# Patient Record
Sex: MALE | Race: WHITE | NOT HISPANIC OR LATINO | URBAN - METROPOLITAN AREA
[De-identification: names, ages, dates, MRNs, and addresses within clinical notes are randomized per-mention and may not be internally consistent; named-entity substitution may affect disease eponyms.]

---

## 2023-06-10 ENCOUNTER — EMERGENCY (EMERGENCY)
Facility: HOSPITAL | Age: 63
LOS: 1 days | End: 2023-06-10
Attending: EMERGENCY MEDICINE
Payer: MEDICARE

## 2023-06-10 VITALS
RESPIRATION RATE: 16 BRPM | HEART RATE: 69 BPM | WEIGHT: 250 LBS | SYSTOLIC BLOOD PRESSURE: 127 MMHG | HEIGHT: 69 IN | TEMPERATURE: 98 F | OXYGEN SATURATION: 96 % | DIASTOLIC BLOOD PRESSURE: 82 MMHG

## 2023-06-10 VITALS
SYSTOLIC BLOOD PRESSURE: 128 MMHG | TEMPERATURE: 98 F | HEART RATE: 74 BPM | DIASTOLIC BLOOD PRESSURE: 66 MMHG | RESPIRATION RATE: 18 BRPM | OXYGEN SATURATION: 97 %

## 2023-06-10 LAB
ALBUMIN SERPL ELPH-MCNC: 4 G/DL — SIGNIFICANT CHANGE UP (ref 3.3–5)
ALP SERPL-CCNC: 60 U/L — SIGNIFICANT CHANGE UP (ref 40–120)
ALT FLD-CCNC: 51 U/L — SIGNIFICANT CHANGE UP (ref 12–78)
ANION GAP SERPL CALC-SCNC: 5 MMOL/L — SIGNIFICANT CHANGE UP (ref 5–17)
AST SERPL-CCNC: 30 U/L — SIGNIFICANT CHANGE UP (ref 15–37)
BASOPHILS # BLD AUTO: 0.03 K/UL — SIGNIFICANT CHANGE UP (ref 0–0.2)
BASOPHILS NFR BLD AUTO: 0.5 % — SIGNIFICANT CHANGE UP (ref 0–2)
BILIRUB SERPL-MCNC: 0.7 MG/DL — SIGNIFICANT CHANGE UP (ref 0.2–1.2)
BUN SERPL-MCNC: 17 MG/DL — SIGNIFICANT CHANGE UP (ref 7–23)
CALCIUM SERPL-MCNC: 9.1 MG/DL — SIGNIFICANT CHANGE UP (ref 8.5–10.1)
CHLORIDE SERPL-SCNC: 106 MMOL/L — SIGNIFICANT CHANGE UP (ref 96–108)
CK SERPL-CCNC: 219 U/L — SIGNIFICANT CHANGE UP (ref 26–308)
CO2 SERPL-SCNC: 26 MMOL/L — SIGNIFICANT CHANGE UP (ref 22–31)
CREAT SERPL-MCNC: 1.2 MG/DL — SIGNIFICANT CHANGE UP (ref 0.5–1.3)
D DIMER BLD IA.RAPID-MCNC: 262 NG/ML DDU — HIGH
EGFR: 68 ML/MIN/1.73M2 — SIGNIFICANT CHANGE UP
EOSINOPHIL # BLD AUTO: 0.13 K/UL — SIGNIFICANT CHANGE UP (ref 0–0.5)
EOSINOPHIL NFR BLD AUTO: 2.3 % — SIGNIFICANT CHANGE UP (ref 0–6)
GLUCOSE SERPL-MCNC: 124 MG/DL — HIGH (ref 70–99)
HCT VFR BLD CALC: 43.4 % — SIGNIFICANT CHANGE UP (ref 39–50)
HGB BLD-MCNC: 14.8 G/DL — SIGNIFICANT CHANGE UP (ref 13–17)
IMM GRANULOCYTES NFR BLD AUTO: 0.5 % — SIGNIFICANT CHANGE UP (ref 0–0.9)
LYMPHOCYTES # BLD AUTO: 1.27 K/UL — SIGNIFICANT CHANGE UP (ref 1–3.3)
LYMPHOCYTES # BLD AUTO: 22.4 % — SIGNIFICANT CHANGE UP (ref 13–44)
MCHC RBC-ENTMCNC: 30.6 PG — SIGNIFICANT CHANGE UP (ref 27–34)
MCHC RBC-ENTMCNC: 34.1 GM/DL — SIGNIFICANT CHANGE UP (ref 32–36)
MCV RBC AUTO: 89.9 FL — SIGNIFICANT CHANGE UP (ref 80–100)
MONOCYTES # BLD AUTO: 0.47 K/UL — SIGNIFICANT CHANGE UP (ref 0–0.9)
MONOCYTES NFR BLD AUTO: 8.3 % — SIGNIFICANT CHANGE UP (ref 2–14)
NEUTROPHILS # BLD AUTO: 3.73 K/UL — SIGNIFICANT CHANGE UP (ref 1.8–7.4)
NEUTROPHILS NFR BLD AUTO: 66 % — SIGNIFICANT CHANGE UP (ref 43–77)
NRBC # BLD: 0 /100 WBCS — SIGNIFICANT CHANGE UP (ref 0–0)
PLATELET # BLD AUTO: 226 K/UL — SIGNIFICANT CHANGE UP (ref 150–400)
POTASSIUM SERPL-MCNC: 4.4 MMOL/L — SIGNIFICANT CHANGE UP (ref 3.5–5.3)
POTASSIUM SERPL-SCNC: 4.4 MMOL/L — SIGNIFICANT CHANGE UP (ref 3.5–5.3)
PROT SERPL-MCNC: 7.8 G/DL — SIGNIFICANT CHANGE UP (ref 6–8.3)
RBC # BLD: 4.83 M/UL — SIGNIFICANT CHANGE UP (ref 4.2–5.8)
RBC # FLD: 13.2 % — SIGNIFICANT CHANGE UP (ref 10.3–14.5)
SODIUM SERPL-SCNC: 137 MMOL/L — SIGNIFICANT CHANGE UP (ref 135–145)
TROPONIN I, HIGH SENSITIVITY RESULT: 8.8 NG/L — SIGNIFICANT CHANGE UP
WBC # BLD: 5.66 K/UL — SIGNIFICANT CHANGE UP (ref 3.8–10.5)
WBC # FLD AUTO: 5.66 K/UL — SIGNIFICANT CHANGE UP (ref 3.8–10.5)

## 2023-06-10 PROCEDURE — 71275 CT ANGIOGRAPHY CHEST: CPT | Mod: MA

## 2023-06-10 PROCEDURE — 36415 COLL VENOUS BLD VENIPUNCTURE: CPT

## 2023-06-10 PROCEDURE — 70450 CT HEAD/BRAIN W/O DYE: CPT | Mod: MA

## 2023-06-10 PROCEDURE — 99285 EMERGENCY DEPT VISIT HI MDM: CPT

## 2023-06-10 PROCEDURE — 80053 COMPREHEN METABOLIC PANEL: CPT

## 2023-06-10 PROCEDURE — 93005 ELECTROCARDIOGRAM TRACING: CPT

## 2023-06-10 PROCEDURE — 71275 CT ANGIOGRAPHY CHEST: CPT | Mod: 26,MA

## 2023-06-10 PROCEDURE — 85379 FIBRIN DEGRADATION QUANT: CPT

## 2023-06-10 PROCEDURE — 84484 ASSAY OF TROPONIN QUANT: CPT

## 2023-06-10 PROCEDURE — 70450 CT HEAD/BRAIN W/O DYE: CPT | Mod: 26,MA

## 2023-06-10 PROCEDURE — 93010 ELECTROCARDIOGRAM REPORT: CPT

## 2023-06-10 PROCEDURE — 99284 EMERGENCY DEPT VISIT MOD MDM: CPT

## 2023-06-10 PROCEDURE — 82550 ASSAY OF CK (CPK): CPT

## 2023-06-10 PROCEDURE — 71045 X-RAY EXAM CHEST 1 VIEW: CPT

## 2023-06-10 PROCEDURE — 99285 EMERGENCY DEPT VISIT HI MDM: CPT | Mod: 25

## 2023-06-10 PROCEDURE — 85025 COMPLETE CBC W/AUTO DIFF WBC: CPT

## 2023-06-10 PROCEDURE — 71045 X-RAY EXAM CHEST 1 VIEW: CPT | Mod: 26

## 2023-06-10 RX ORDER — SODIUM CHLORIDE 9 MG/ML
1000 INJECTION INTRAMUSCULAR; INTRAVENOUS; SUBCUTANEOUS ONCE
Refills: 0 | Status: COMPLETED | OUTPATIENT
Start: 2023-06-10 | End: 2023-06-10

## 2023-06-10 RX ADMIN — SODIUM CHLORIDE 1000 MILLILITER(S): 9 INJECTION INTRAMUSCULAR; INTRAVENOUS; SUBCUTANEOUS at 13:41

## 2023-06-10 NOTE — CONSULT NOTE ADULT - ASSESSMENT
63 year old male with past medical history of HTN TIA HLD DM presenting with dizziness. Reports dizziness began around 11am today with near syncope.     EKG SR  Troponin negative   Bp 127/82   check orthostatics    63 year old male with past medical history of HTN TIA HLD DM presenting with dizziness. Reports dizziness began around 11am today with near syncope.     EKG SR  Troponin negative   Bp 127/82   orthostatics negative    - dizziness and near syncope this am may be related to him taking hydroxyzine which he took for the 1st time last night  - no sign of acute ischemia  - continues to feel off which he describes as being "stoned", though not having any ectopy or arrythmia at this time.   - does have a history of a TIA, though has no focal neurologic symptoms. Would consider CT head  - did not eat or drink, and presumably a component of dehydration  - agree with ivf  - after fluids are finished, walk him around er to see how he feels  - if doing well and work up all negative, can dc home. He will fu with his cardiologist in [place]

## 2023-06-10 NOTE — ED PROVIDER NOTE - DIFFERENTIAL DIAGNOSIS
Differential Diagnosis Rule out dehydration, cardiac arrhythmia, MI, electrolyte abnormality, leukocytosis, other acute pathology

## 2023-06-10 NOTE — ED PROVIDER NOTE - CARE PROVIDER_API CALL
Edgar Guo  Cardiovascular Disease  [address], [place] [id number]  Phone: [phone]  Fax: [phone]  Follow Up Time:

## 2023-06-10 NOTE — ED PROVIDER NOTE - OBJECTIVE STATEMENT
63-year-old male with a history of hypertension, TIA, high cholesterol, type 2 diabetes has been complaining of dizziness/lightheaded since around 11 AM today.  Patient had an near syncopal episode around that time.  Patient did not fall, was able to sit on the floor and felt improved.  Patient been having some mild persistent lightheadedness since that time.  No acute chest pain or shortness of breath.  No palpitations.  No cough/URI.  No recent COVID exposure or infection.  Patient previously vaccinated for COVID.  No numbness/tingling/focal weakness.  No vertigo.  Patient is not unsteady with his gait.  No aggravating or alleviating factors otherwise noted.  No other acute injury or complaints.

## 2023-06-10 NOTE — CONSULT NOTE ADULT - SUBJECTIVE AND OBJECTIVE BOX
[facility] - Tova Gibbons, Haider Jaime Savella, [place]   Office Number: [phone]    Initial Consult Note    CHIEF COMPLAINT: Patient is a 63y old  Male who presents with a chief complaint of dizziness    HPI:    63 year old male with past medical history of HTN TIA HLD DM presenting with dizziness. Reports dizziness began around 11am today with near syncope.   Denies chest pain shortness of breath palpitations. No LOC . No recent illness, denies nausea, vomiting diarrhea fever or chills.   PAST MEDICAL & SURGICAL HISTORY:      SOCIAL HISTORY:  No tobacco, ethanol, or drug abuse.    FAMILY HISTORY:    No family history of acute MI or sudden cardiac death.    MEDICATIONS  (STANDING):    MEDICATIONS  (PRN):      Allergies    No Known Allergies    Intolerances        REVIEW OF SYSTEMS:  All other review of systems is negative unless indicated above    VITAL SIGNS:   Vital Signs Last 24 Hrs  T(C): 36.7 (10 Ovidio 2023 12:54), Max: 36.7 (10 Ovidio 2023 12:54)  T(F): 98.1 (10 Ovidio 2023 12:54), Max: 98.1 (10 Ovidio 2023 12:54)  HR: 69 (10 Ovidio 2023 12:54) (69 - 69)  BP: 127/82 (10 Ovidio 2023 12:54) (127/82 - 127/82)  BP(mean): --  RR: 16 (10 Ovidio 2023 12:54) (16 - 16)  SpO2: 96% (10 Ovidio 2023 12:54) (96% - 96%)    Parameters below as of 10 Ovidio 2023 12:54  Patient On (Oxygen Delivery Method): room air        I&O's Summary      On Exam:    Constitutional: NAD, alert and oriented x 3  Lungs:  Non-labored, breath sounds are clear bilaterally, No wheezing, rales or rhonchi  Cardiovascular: RRR.  S1 and S2 positive.  No murmurs, rubs, gallops or clicks  Gastrointestinal: Bowel Sounds present, soft, nontender.   Lymph: No peripheral edema. No cervical lymphadenopathy.  Neurological: Alert, no focal deficits  Skin: No rashes or ulcers   Psych:  Mood & affect appropriate.    LABS: All Labs Reviewed:                        14.8   5.66  )-----------( 226      ( 10 Ovidio 2023 13:40 )             43.4     10 Ovidio 2023 13:40    137    |  106    |  17     ----------------------------<  124    4.4     |  26     |  1.20     Ca    9.1        10 Ovidio 2023 13:40    TPro  7.8    /  Alb  4.0    /  TBili  0.7    /  DBili  x      /  AST  30     /  ALT  51     /  AlkPhos  60     10 Ovidio 2023 13:40      CARDIAC MARKERS ( 10 Ovidio 2023 13:40 )  x     / x     / 219 U/L / x     / x          Blood Culture:         RADIOLOGY:    EKG:         [facility] - Tova Gibbons, Haider Jaime Savella, [place]   Office Number: [phone]    Initial Consult Note    CHIEF COMPLAINT: Patient is a 63y old  Male who presents with a chief complaint of dizziness    HPI:    63 year old male with past medical history of HTN TIA HLD DM presenting with dizziness. Reports dizziness began around 11am today with near syncope.   Denies chest pain shortness of breath palpitations. No LOC . No recent illness, denies nausea, vomiting diarrhea fever or chills.     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    FAMILY HISTORY:    No family history of acute MI or sudden cardiac death.    MEDICATIONS  (STANDING):    MEDICATIONS  (PRN):      Allergies    No Known Allergies    Intolerances        REVIEW OF SYSTEMS:  All other review of systems is negative unless indicated above    VITAL SIGNS:   Vital Signs Last 24 Hrs  T(C): 36.7 (10 Ovidio 2023 12:54), Max: 36.7 (10 Ovidio 2023 12:54)  T(F): 98.1 (10 Ovidio 2023 12:54), Max: 98.1 (10 Ovidio 2023 12:54)  HR: 69 (10 Ovidio 2023 12:54) (69 - 69)  BP: 127/82 (10 Ovidio 2023 12:54) (127/82 - 127/82)  BP(mean): --  RR: 16 (10 Ovidio 2023 12:54) (16 - 16)  SpO2: 96% (10 Ovidio 2023 12:54) (96% - 96%)    Parameters below as of 10 Ovidio 2023 12:54  Patient On (Oxygen Delivery Method): room air        I&O's Summary      On Exam:    Constitutional: NAD, alert and oriented x 3  Lungs:  Non-labored, breath sounds are clear bilaterally, No wheezing, rales or rhonchi  Cardiovascular: RRR.  S1 and S2 positive.  No murmurs, rubs, gallops or clicks  Gastrointestinal: Bowel Sounds present, soft, nontender.   Lymph: No peripheral edema. No cervical lymphadenopathy.  Neurological: Alert, no focal deficits  Skin: No rashes or ulcers   Psych:  Mood & affect appropriate.    LABS: All Labs Reviewed:                        14.8   5.66  )-----------( 226      ( 10 Ovidio 2023 13:40 )             43.4     10 Ovidio 2023 13:40    137    |  106    |  17     ----------------------------<  124    4.4     |  26     |  1.20     Ca    9.1        10 Ovidio 2023 13:40    TPro  7.8    /  Alb  4.0    /  TBili  0.7    /  DBili  x      /  AST  30     /  ALT  51     /  AlkPhos  60     10 Ovidio 2023 13:40      CARDIAC MARKERS ( 10 Ovidio 2023 13:40 )  x     / x     / 219 U/L / x     / x          Blood Culture:         RADIOLOGY:    EKG:sr

## 2023-06-10 NOTE — ED ADULT NURSE NOTE - OBJECTIVE STATEMENT
Was at [employer] store when he began to feel dizzy, lightheaded and shaky and had to sit down.  Instructed wife to call 911.  Pt with history of TIA, wife accidentally gave pt her topamax last night and also gave him hydroxazine to help him sleep.  Ambulatory with steady gait, A&OX4, able to make needs known.

## 2023-06-10 NOTE — ED ADULT NURSE NOTE - NSFALLUNIVINTERV_ED_ALL_ED
Bed/Stretcher in lowest position, wheels locked, appropriate side rails in place/Call bell, personal items and telephone in reach/Instruct patient to call for assistance before getting out of bed/chair/stretcher/Non-slip footwear applied when patient is off stretcher/[place] to call system/Physically safe environment - no spills, clutter or unnecessary equipment/Purposeful proactive rounding/Room/bathroom lighting operational, light cord in reach

## 2023-06-10 NOTE — ED ADULT TRIAGE NOTE - CHIEF COMPLAINT QUOTE
63 yr M BIBEMS. pt c/o lightheadedness, felt like he was going to pass out while at [employer], sat down, no fall. called 911. pt denies HA/CP/palpitations, SOB, blurry vision, N/V/D. on baby aspirin. pt also took Topamax accidentally this AM. 63 yr M BIBEMS. pt c/o lightheadedness, felt like he was going to pass out while at [employer], sat down, no fall. called 911. pt denies HA/CP/palpitations, SOB, blurry vision, N/V/D.  pt also took Topamax accidentally this AM. Hx TIA

## 2023-06-10 NOTE — CONSULT NOTE ADULT - NS ATTEND AMEND GEN_ALL_CORE FT
- dizziness and near syncope this am may be related to him taking hydroxyzine which he took for the 1st time last night  - no sign of acute ischemia  - continues to feel off which he describes as being "stoned", though not having any ectopy or arrythmia at this time.   - does have a history of a TIA, though has no focal neurologic symptoms. Would consider CT head  - did not eat or drink, and presumably a component of dehydration  - very mild dimer elevation, though suspicion for pe is minimal

## 2023-06-10 NOTE — ED PROVIDER NOTE - ENMT, MLM
Airway patent, Nasal mucosa clear. Mouth with normal mucosa. Throat has no vesicles, no oropharyngeal exudates and uvula is midline. neck supple. no meningeal signs. no ext signs of head trauma

## 2023-06-10 NOTE — ED PROVIDER NOTE - PROGRESS NOTE DETAILS
Patient seen by Dr. Guo, agree with CT angio, should check CT head, outpatient follow-up if no acute findings. Patient doing well, much improved at this time.  Discussed with patient, with family, patient is no longer having any dizziness, is feeling much better.  Discussed with patient and family regarding the nature of his illness, possibility of medication induced hypotension/near syncope.  Discussed with patient regarding dizzy precautions and instructions, chest pain shortness of breath precautions, importance of close prompt follow-up as outpatient for definitive work-up and treatment, and to return with any acute change or concerns.  Patient is comfortable with discharge at this time, will follow-up as soon as possible with his primary care.

## 2023-06-10 NOTE — ED PROVIDER NOTE - CLINICAL SUMMARY MEDICAL DECISION MAKING FREE TEXT BOX
Dizzy/lightheaded since around 11 AM, with some improvement.  No acute chest pain or shortness of breath.  No recent travel, no recent trauma.  Will check labs, IV fluids, reeval

## 2023-06-10 NOTE — ED ADULT NURSE NOTE - CHIEF COMPLAINT QUOTE
63 yr M BIBEMS. pt c/o lightheadedness, felt like he was going to pass out while at [employer], sat down, no fall. called 911. pt denies HA/CP/palpitations, SOB, blurry vision, N/V/D.  pt also took Topamax accidentally this AM. Hx TIA

## 2023-06-10 NOTE — ED PROVIDER NOTE - NSFOLLOWUPINSTRUCTIONS_ED_ALL_ED_FT
1. Follow-up with your Primary Medical Doctor, Dr Sidney Finney . Call monday for prompt follow-up.  2. Follow-up with Cardiology as discussed. Call Monday for prompt follow-up and further workup and evaluation.  3. Return to Emergency room for any worsening or persistent pain, shortness of breath, weakness, fever, abdominal pain, dizziness, passing out, unexplained pain in arms, legs, back, any vomiting, feeling like your heart is racing,  or any other concerning symptoms.  4. See attached instruction sheets for additional information, including information regarding signs and symptoms to look out for, reasons to seek immediate care and other important instructions.

## 2023-06-10 NOTE — ED PROVIDER NOTE - PATIENT PORTAL LINK FT
You can access the FollowMyHealth Patient Portal offered by [facility] by registering at the following website: [email or web address]. By joining [facility]’s FollowMyHealth portal, you will also be able to view your health information using other applications (apps) compatible with our system.

## 2023-10-17 ENCOUNTER — TELEPHONE (OUTPATIENT)
Age: 63
End: 2023-10-17